# Patient Record
Sex: MALE | Race: WHITE | NOT HISPANIC OR LATINO | URBAN - METROPOLITAN AREA
[De-identification: names, ages, dates, MRNs, and addresses within clinical notes are randomized per-mention and may not be internally consistent; named-entity substitution may affect disease eponyms.]

---

## 2020-05-23 ENCOUNTER — OFFICE VISIT (OUTPATIENT)
Dept: URGENT CARE | Facility: PHYSICIAN GROUP | Age: 25
End: 2020-05-23

## 2020-05-23 VITALS
HEART RATE: 75 BPM | DIASTOLIC BLOOD PRESSURE: 72 MMHG | OXYGEN SATURATION: 100 % | RESPIRATION RATE: 16 BRPM | SYSTOLIC BLOOD PRESSURE: 110 MMHG | TEMPERATURE: 98.9 F

## 2020-05-23 DIAGNOSIS — G51.0 BELL'S PALSY: ICD-10-CM

## 2020-05-23 PROCEDURE — 99204 OFFICE O/P NEW MOD 45 MIN: CPT | Performed by: NURSE PRACTITIONER

## 2020-05-23 RX ORDER — PREDNISONE 20 MG/1
60 TABLET ORAL DAILY
Qty: 21 TAB | Refills: 0 | Status: SHIPPED | OUTPATIENT
Start: 2020-05-23 | End: 2020-05-30

## 2020-05-23 RX ORDER — ACYCLOVIR 400 MG/1
400 TABLET ORAL
Qty: 50 TAB | Refills: 0 | Status: SHIPPED | OUTPATIENT
Start: 2020-05-23 | End: 2020-06-02

## 2020-05-23 ASSESSMENT — ENCOUNTER SYMPTOMS
MYALGIAS: 0
CHILLS: 0
DOUBLE VISION: 0
WHEEZING: 0
BLURRED VISION: 0
HEARTBURN: 0
SHORTNESS OF BREATH: 0
SENSORY CHANGE: 0
FEVER: 0
TINGLING: 0
SEIZURES: 0
PALPITATIONS: 0
MEMORY LOSS: 0
PHOTOPHOBIA: 0
BACK PAIN: 0
LOSS OF CONSCIOUSNESS: 0
EYE PAIN: 0
COUGH: 0
DIZZINESS: 0
DIARRHEA: 0
HEADACHES: 0
ORTHOPNEA: 0
SORE THROAT: 0
CONSTIPATION: 0

## 2020-05-23 ASSESSMENT — VISUAL ACUITY: OU: 1

## 2020-05-23 NOTE — PROGRESS NOTES
"Subjective:      Rickie Delong is a 24 y.o. male who presents with Eye Problem (right eye feels like something in it, right side of lip numb,cant smile or fully close right eye, x 3 days)            Patient presents to  with complaint of facial droop and difficulty closing right eye x 3days.  Patient reports both his grandmother and father have hx of Bell's Palsy and he was suspicious of this. Reports he was ill approximately 3 weeks ago with viral illness. Reports increased stress taking care of his grandmother. His right eye is constantly tearing and feels dry. Denies pain or visual changes. Has been using eye drops which do provide some relief. He does have associated right side facial droop with \"heavy sensation\".  No prior contributing medical history.  PMH:  has no past medical history on file.  MEDS:   Current Outpatient Medications:   •  predniSONE (DELTASONE) 20 MG Tab, Take 3 Tabs by mouth every day for 7 days., Disp: 21 Tab, Rfl: 0  •  acyclovir (ZOVIRAX) 400 MG tablet, Take 1 Tab by mouth 5 Times a Day for 10 days., Disp: 50 Tab, Rfl: 0  ALLERGIES: No Known Allergies  SURGHX: History reviewed. No pertinent surgical history.  SOCHX:  reports that he has been smoking. He has never used smokeless tobacco.  FH: Reviewed with patient, not pertinent to this visit.     Review of Systems   Constitutional: Negative for chills, fever and malaise/fatigue.   HENT: Negative for congestion, ear discharge, ear pain, hearing loss and sore throat.    Eyes: Positive for discharge ( Excess tearing). Negative for blurred vision, double vision, photophobia, pain and redness.   Respiratory: Negative for cough, shortness of breath and wheezing.    Cardiovascular: Negative for chest pain, palpitations, orthopnea and leg swelling.   Gastrointestinal: Negative for abdominal pain, constipation, diarrhea and heartburn.   Musculoskeletal: Negative for back pain, joint pain, myalgias and neck pain.   Skin: Negative for rash. "   Neurological: Negative for dizziness, tingling, tremors, sensory change, speech change, focal weakness, seizures, loss of consciousness and headaches.   Psychiatric/Behavioral: Negative for memory loss, substance abuse and suicidal ideas.   All other systems reviewed and are negative.         Objective:     /72 (BP Location: Left arm, Patient Position: Sitting, BP Cuff Size: Adult)   Pulse 75   Temp 37.2 °C (98.9 °F) (Temporal)   Resp 16   SpO2 100%      Physical Exam  Vitals signs reviewed.   Constitutional:       General: He is not in acute distress.     Appearance: Normal appearance. He is not ill-appearing, toxic-appearing or diaphoretic.   HENT:      Head: Normocephalic.      Right Ear: Tympanic membrane, ear canal and external ear normal.      Left Ear: Tympanic membrane, ear canal and external ear normal.      Nose: Nose normal. No congestion.      Mouth/Throat:      Lips: Pink.      Mouth: Mucous membranes are moist.      Tongue: No lesions. Tongue does not deviate from midline.      Pharynx: Uvula midline. No pharyngeal swelling or posterior oropharyngeal erythema.   Eyes:      General: Lids are normal. Lids are everted, no foreign bodies appreciated. Vision grossly intact. Gaze aligned appropriately. No visual field deficit.        Right eye: No foreign body, discharge or hordeolum.      Extraocular Movements: Extraocular movements intact.      Conjunctiva/sclera: Conjunctivae normal.      Pupils: Pupils are equal, round, and reactive to light.   Neck:      Musculoskeletal: Normal range of motion and neck supple. No neck rigidity.   Cardiovascular:      Rate and Rhythm: Normal rate and regular rhythm.      Pulses: Normal pulses.      Heart sounds: Normal heart sounds. No murmur.   Pulmonary:      Effort: Pulmonary effort is normal.      Breath sounds: Normal breath sounds. No wheezing.   Abdominal:      General: Abdomen is flat. Bowel sounds are normal.      Palpations: Abdomen is soft.       Tenderness: There is no abdominal tenderness.   Musculoskeletal: Normal range of motion.      Right lower leg: No edema.      Left lower leg: No edema.   Lymphadenopathy:      Cervical: No cervical adenopathy.   Skin:     General: Skin is warm and dry.      Capillary Refill: Capillary refill takes less than 2 seconds.   Neurological:      Mental Status: He is alert and oriented to person, place, and time.      GCS: GCS eye subscore is 4. GCS verbal subscore is 5. GCS motor subscore is 6.      Cranial Nerves: Facial asymmetry present. No dysarthria.      Sensory: Sensation is intact. No sensory deficit.      Motor: No pronator drift.      Coordination: Coordination is intact.      Gait: Gait is intact.      Deep Tendon Reflexes: Reflexes are normal and symmetric.      Comments: Cranial nerve examination reveals a complete facial droop of right side of face that involves the forehead, normal sensation of the face bilaterally.  Normal and symmetric upper and lower extremity motor and sensory function bilaterally.  Normal coordination with finger to nose, no pronator drift.   Psychiatric:         Mood and Affect: Mood normal.         Behavior: Behavior normal.         Thought Content: Thought content normal.                 Assessment/Plan:       1. Bell's palsy  Differential diagnosis, natural history, supportive care, and indications for immediate follow-up discussed at length.     Patient will be treated with prednisone and valcyclovir. Advised patient to follow up with ophthamology and we discussed the importance of eye protection.  Strict return precautions were provided.       - predniSONE (DELTASONE) 20 MG Tab; Take 3 Tabs by mouth every day for 7 days.  Dispense: 21 Tab; Refill: 0  - acyclovir (ZOVIRAX) 400 M  G tablet; Take 1 Tab by mouth 5 Times a Day for 10 days.  Dispense: 50 Tab; Refill: 0    - REFERRAL TO OPHTHALMOLOGY  - REFERRAL TO NEUROLOGY

## 2020-05-23 NOTE — PATIENT INSTRUCTIONS
Bell Palsy  Bell palsy is a condition in which the muscles on one side of the face become paralyzed. This often causes one side of the face to droop. It is a common condition and most people recover completely.  RISK FACTORS  Risk factors for Bell palsy include:  · Pregnancy.  · Diabetes.  · An infection by a virus, such as infections that cause cold sores.  CAUSES   Bell palsy is caused by damage to or inflammation of a nerve in your face. It is unclear why this happens, but an infection by a virus may lead to it. Most of the time the reason it happens is unknown.  SIGNS AND SYMPTOMS   Symptoms can range from mild to severe and can take place over a number of hours. Symptoms may include:  · Being unable to:  ¨ Raise one or both eyebrows.  ¨ Close one or both eyes.  ¨ Feel parts of your face (facial numbness).  · Drooping of the eyelid and corner of the mouth.  · Weakness in the face.  · Paralysis of half your face.  · Loss of taste.  · Sensitivity to loud noises.  · Difficulty chewing.  · Tearing up of the affected eye.  · Dryness in the affected eye.  · Drooling.  · Pain behind one ear.  DIAGNOSIS   Diagnosis of Bell palsy may include:  · A medical history and physical exam.  · An MRI.  · A CT scan.  · Electromyography (EMG). This is a test that checks how your nerves are working.  TREATMENT   Treatment may include antiviral medicine to help shorten the length of the condition. Sometimes treatment is not needed and the symptoms go away on their own.  HOME CARE INSTRUCTIONS   · Take medicines only as directed by your health care provider.  · Do facial massages and exercises as directed by your health care provider.  · If your eye is affected:  ¨ Use moisturizing eye drops to prevent drying of your eye as directed by your health care provider.  ¨ Protect your eye as directed by your health care provider.  SEEK MEDICAL CARE IF:  · Your symptoms do not get better or get worse.  · You are drooling.  · Your eye is red,  irritated, or hurts.  SEEK IMMEDIATE MEDICAL CARE IF:   · Another part of your body feels weak or numb.  · You have difficulty swallowing.  · You have a fever along with symptoms of Bell palsy.  · You develop neck pain.  MAKE SURE YOU:   · Understand these instructions.  · Will watch your condition.  · Will get help right away if you are not doing well or get worse.  This information is not intended to replace advice given to you by your health care provider. Make sure you discuss any questions you have with your health care provider.  Document Released: 12/18/2006 Document Revised: 09/07/2016 Document Reviewed: 03/27/2015  [employer] Patient Education © 2017 [employer]

## 2020-05-24 ASSESSMENT — ENCOUNTER SYMPTOMS
FOCAL WEAKNESS: 0
TREMORS: 0
SPEECH CHANGE: 0
NECK PAIN: 0
EYE DISCHARGE: 1
EYE REDNESS: 0
ABDOMINAL PAIN: 0

## 2020-05-24 ASSESSMENT — LIFESTYLE VARIABLES: SUBSTANCE_ABUSE: 0
